# Patient Record
Sex: FEMALE | Race: BLACK OR AFRICAN AMERICAN | NOT HISPANIC OR LATINO | URBAN - METROPOLITAN AREA
[De-identification: names, ages, dates, MRNs, and addresses within clinical notes are randomized per-mention and may not be internally consistent; named-entity substitution may affect disease eponyms.]

---

## 2023-01-01 ENCOUNTER — INPATIENT (INPATIENT)
Facility: HOSPITAL | Age: 88
LOS: 0 days | End: 2023-10-11
Attending: INTERNAL MEDICINE | Admitting: INTERNAL MEDICINE
Payer: MEDICARE

## 2023-01-01 VITALS
WEIGHT: 108.03 LBS | HEIGHT: 66 IN | HEART RATE: 112 BPM | SYSTOLIC BLOOD PRESSURE: 56 MMHG | DIASTOLIC BLOOD PRESSURE: 36 MMHG

## 2023-01-01 VITALS — HEART RATE: 98 BPM | OXYGEN SATURATION: 61 %

## 2023-01-01 LAB
A1C WITH ESTIMATED AVERAGE GLUCOSE RESULT: 6.8 % — HIGH (ref 4–5.6)
ALBUMIN SERPL ELPH-MCNC: 2 G/DL — LOW (ref 3.3–5)
ALBUMIN SERPL ELPH-MCNC: 2 G/DL — LOW (ref 3.3–5)
ALBUMIN SERPL ELPH-MCNC: 2.3 G/DL — LOW (ref 3.3–5)
ALP SERPL-CCNC: 106 U/L — SIGNIFICANT CHANGE UP (ref 40–120)
ALP SERPL-CCNC: 117 U/L — SIGNIFICANT CHANGE UP (ref 40–120)
ALP SERPL-CCNC: 97 U/L — SIGNIFICANT CHANGE UP (ref 40–120)
ALT FLD-CCNC: 118 U/L — HIGH (ref 12–78)
ALT FLD-CCNC: 120 U/L — HIGH (ref 12–78)
ALT FLD-CCNC: 158 U/L — HIGH (ref 12–78)
ANION GAP SERPL CALC-SCNC: 11 MMOL/L — SIGNIFICANT CHANGE UP (ref 5–17)
ANION GAP SERPL CALC-SCNC: 12 MMOL/L — SIGNIFICANT CHANGE UP (ref 5–17)
ANION GAP SERPL CALC-SCNC: 8 MMOL/L — SIGNIFICANT CHANGE UP (ref 5–17)
APTT BLD: 34.1 SEC — SIGNIFICANT CHANGE UP (ref 24.5–35.6)
AST SERPL-CCNC: 144 U/L — HIGH (ref 15–37)
AST SERPL-CCNC: 204 U/L — HIGH (ref 15–37)
AST SERPL-CCNC: 225 U/L — HIGH (ref 15–37)
BASOPHILS # BLD AUTO: 0 K/UL — SIGNIFICANT CHANGE UP (ref 0–0.2)
BASOPHILS # BLD AUTO: 0.04 K/UL — SIGNIFICANT CHANGE UP (ref 0–0.2)
BASOPHILS NFR BLD AUTO: 0 % — SIGNIFICANT CHANGE UP (ref 0–2)
BASOPHILS NFR BLD AUTO: 0.5 % — SIGNIFICANT CHANGE UP (ref 0–2)
BILIRUB SERPL-MCNC: 0.2 MG/DL — SIGNIFICANT CHANGE UP (ref 0.2–1.2)
BILIRUB SERPL-MCNC: 0.3 MG/DL — SIGNIFICANT CHANGE UP (ref 0.2–1.2)
BILIRUB SERPL-MCNC: 0.4 MG/DL — SIGNIFICANT CHANGE UP (ref 0.2–1.2)
BUN SERPL-MCNC: 23 MG/DL — SIGNIFICANT CHANGE UP (ref 7–23)
BUN SERPL-MCNC: 27 MG/DL — HIGH (ref 7–23)
BUN SERPL-MCNC: 31 MG/DL — HIGH (ref 7–23)
CALCIUM SERPL-MCNC: 8.1 MG/DL — LOW (ref 8.5–10.1)
CALCIUM SERPL-MCNC: 8.2 MG/DL — LOW (ref 8.5–10.1)
CALCIUM SERPL-MCNC: 8.5 MG/DL — SIGNIFICANT CHANGE UP (ref 8.5–10.1)
CHLORIDE SERPL-SCNC: 108 MMOL/L — SIGNIFICANT CHANGE UP (ref 96–108)
CHLORIDE SERPL-SCNC: 109 MMOL/L — HIGH (ref 96–108)
CHLORIDE SERPL-SCNC: 111 MMOL/L — HIGH (ref 96–108)
CO2 SERPL-SCNC: 23 MMOL/L — SIGNIFICANT CHANGE UP (ref 22–31)
CO2 SERPL-SCNC: 23 MMOL/L — SIGNIFICANT CHANGE UP (ref 22–31)
CO2 SERPL-SCNC: 24 MMOL/L — SIGNIFICANT CHANGE UP (ref 22–31)
CREAT SERPL-MCNC: 1.29 MG/DL — SIGNIFICANT CHANGE UP (ref 0.5–1.3)
CREAT SERPL-MCNC: 1.38 MG/DL — HIGH (ref 0.5–1.3)
CREAT SERPL-MCNC: 1.72 MG/DL — HIGH (ref 0.5–1.3)
EGFR: 26 ML/MIN/1.73M2 — LOW
EGFR: 34 ML/MIN/1.73M2 — LOW
EGFR: 37 ML/MIN/1.73M2 — LOW
EOSINOPHIL # BLD AUTO: 0 K/UL — SIGNIFICANT CHANGE UP (ref 0–0.5)
EOSINOPHIL # BLD AUTO: 0 K/UL — SIGNIFICANT CHANGE UP (ref 0–0.5)
EOSINOPHIL NFR BLD AUTO: 0 % — SIGNIFICANT CHANGE UP (ref 0–6)
EOSINOPHIL NFR BLD AUTO: 0 % — SIGNIFICANT CHANGE UP (ref 0–6)
ESTIMATED AVERAGE GLUCOSE: 148 MG/DL — HIGH (ref 68–114)
GAS PNL BLDA: SIGNIFICANT CHANGE UP
GAS PNL BLDA: SIGNIFICANT CHANGE UP
GLUCOSE BLDC GLUCOMTR-MCNC: 15 MG/DL — CRITICAL LOW (ref 70–99)
GLUCOSE BLDC GLUCOMTR-MCNC: 16 MG/DL — CRITICAL LOW (ref 70–99)
GLUCOSE BLDC GLUCOMTR-MCNC: 34 MG/DL — CRITICAL LOW (ref 70–99)
GLUCOSE BLDC GLUCOMTR-MCNC: 56 MG/DL — LOW (ref 70–99)
GLUCOSE BLDC GLUCOMTR-MCNC: 63 MG/DL — LOW (ref 70–99)
GLUCOSE BLDC GLUCOMTR-MCNC: 99 MG/DL — SIGNIFICANT CHANGE UP (ref 70–99)
GLUCOSE SERPL-MCNC: 23 MG/DL — CRITICAL LOW (ref 70–99)
GLUCOSE SERPL-MCNC: 271 MG/DL — HIGH (ref 70–99)
GLUCOSE SERPL-MCNC: 276 MG/DL — HIGH (ref 70–99)
HCT VFR BLD CALC: 36.4 % — SIGNIFICANT CHANGE UP (ref 34.5–45)
HCT VFR BLD CALC: 38.9 % — SIGNIFICANT CHANGE UP (ref 34.5–45)
HCT VFR BLD CALC: 39.1 % — SIGNIFICANT CHANGE UP (ref 34.5–45)
HGB BLD-MCNC: 10.6 G/DL — LOW (ref 11.5–15.5)
HGB BLD-MCNC: 12.1 G/DL — SIGNIFICANT CHANGE UP (ref 11.5–15.5)
HGB BLD-MCNC: 12.3 G/DL — SIGNIFICANT CHANGE UP (ref 11.5–15.5)
IMM GRANULOCYTES NFR BLD AUTO: 0.6 % — SIGNIFICANT CHANGE UP (ref 0–0.9)
INR BLD: 1.03 RATIO — SIGNIFICANT CHANGE UP (ref 0.85–1.18)
LACTATE SERPL-SCNC: 3 MMOL/L — HIGH (ref 0.7–2)
LACTATE SERPL-SCNC: 4.6 MMOL/L — CRITICAL HIGH (ref 0.7–2)
LYMPHOCYTES # BLD AUTO: 0.96 K/UL — LOW (ref 1–3.3)
LYMPHOCYTES # BLD AUTO: 1.73 K/UL — SIGNIFICANT CHANGE UP (ref 1–3.3)
LYMPHOCYTES # BLD AUTO: 11.3 % — LOW (ref 13–44)
LYMPHOCYTES # BLD AUTO: 26 % — SIGNIFICANT CHANGE UP (ref 13–44)
MAGNESIUM SERPL-MCNC: 2.2 MG/DL — SIGNIFICANT CHANGE UP (ref 1.6–2.6)
MAGNESIUM SERPL-MCNC: 2.2 MG/DL — SIGNIFICANT CHANGE UP (ref 1.6–2.6)
MAGNESIUM SERPL-MCNC: 2.4 MG/DL — SIGNIFICANT CHANGE UP (ref 1.6–2.6)
MANUAL SMEAR VERIFICATION: SIGNIFICANT CHANGE UP
MCHC RBC-ENTMCNC: 27.2 PG — SIGNIFICANT CHANGE UP (ref 27–34)
MCHC RBC-ENTMCNC: 27.4 PG — SIGNIFICANT CHANGE UP (ref 27–34)
MCHC RBC-ENTMCNC: 27.8 PG — SIGNIFICANT CHANGE UP (ref 27–34)
MCHC RBC-ENTMCNC: 29.1 G/DL — LOW (ref 32–36)
MCHC RBC-ENTMCNC: 30.9 G/DL — LOW (ref 32–36)
MCHC RBC-ENTMCNC: 31.6 G/DL — LOW (ref 32–36)
MCV RBC AUTO: 87.8 FL — SIGNIFICANT CHANGE UP (ref 80–100)
MCV RBC AUTO: 88.7 FL — SIGNIFICANT CHANGE UP (ref 80–100)
MCV RBC AUTO: 93.3 FL — SIGNIFICANT CHANGE UP (ref 80–100)
MONOCYTES # BLD AUTO: 0.6 K/UL — SIGNIFICANT CHANGE UP (ref 0–0.9)
MONOCYTES # BLD AUTO: 0.66 K/UL — SIGNIFICANT CHANGE UP (ref 0–0.9)
MONOCYTES NFR BLD AUTO: 7.7 % — SIGNIFICANT CHANGE UP (ref 2–14)
MONOCYTES NFR BLD AUTO: 9 % — SIGNIFICANT CHANGE UP (ref 2–14)
NEUTROPHILS # BLD AUTO: 4.34 K/UL — SIGNIFICANT CHANGE UP (ref 1.8–7.4)
NEUTROPHILS # BLD AUTO: 6.82 K/UL — SIGNIFICANT CHANGE UP (ref 1.8–7.4)
NEUTROPHILS NFR BLD AUTO: 65 % — SIGNIFICANT CHANGE UP (ref 43–77)
NEUTROPHILS NFR BLD AUTO: 79.9 % — HIGH (ref 43–77)
NRBC # BLD: 0 /100 WBCS — SIGNIFICANT CHANGE UP (ref 0–0)
NRBC # BLD: 0 /100 WBCS — SIGNIFICANT CHANGE UP (ref 0–0)
NRBC # BLD: 0 /100 — SIGNIFICANT CHANGE UP (ref 0–0)
NRBC # BLD: SIGNIFICANT CHANGE UP /100 WBCS (ref 0–0)
NT-PROBNP SERPL-SCNC: 9866 PG/ML — HIGH (ref 0–450)
PHOSPHATE SERPL-MCNC: 2.9 MG/DL — SIGNIFICANT CHANGE UP (ref 2.5–4.5)
PHOSPHATE SERPL-MCNC: 4.1 MG/DL — SIGNIFICANT CHANGE UP (ref 2.5–4.5)
PLAT MORPH BLD: NORMAL — SIGNIFICANT CHANGE UP
PLATELET # BLD AUTO: 344 K/UL — SIGNIFICANT CHANGE UP (ref 150–400)
PLATELET # BLD AUTO: 392 K/UL — SIGNIFICANT CHANGE UP (ref 150–400)
PLATELET # BLD AUTO: 402 K/UL — HIGH (ref 150–400)
POTASSIUM SERPL-MCNC: 3.3 MMOL/L — LOW (ref 3.5–5.3)
POTASSIUM SERPL-MCNC: 3.4 MMOL/L — LOW (ref 3.5–5.3)
POTASSIUM SERPL-MCNC: 3.8 MMOL/L — SIGNIFICANT CHANGE UP (ref 3.5–5.3)
POTASSIUM SERPL-SCNC: 3.3 MMOL/L — LOW (ref 3.5–5.3)
POTASSIUM SERPL-SCNC: 3.4 MMOL/L — LOW (ref 3.5–5.3)
POTASSIUM SERPL-SCNC: 3.8 MMOL/L — SIGNIFICANT CHANGE UP (ref 3.5–5.3)
PROT SERPL-MCNC: 5.5 GM/DL — LOW (ref 6–8.3)
PROT SERPL-MCNC: 5.8 GM/DL — LOW (ref 6–8.3)
PROT SERPL-MCNC: 6.1 GM/DL — SIGNIFICANT CHANGE UP (ref 6–8.3)
PROTHROM AB SERPL-ACNC: 12.3 SEC — SIGNIFICANT CHANGE UP (ref 9.5–13)
RAPID RVP RESULT: DETECTED
RBC # BLD: 3.9 M/UL — SIGNIFICANT CHANGE UP (ref 3.8–5.2)
RBC # BLD: 4.41 M/UL — SIGNIFICANT CHANGE UP (ref 3.8–5.2)
RBC # BLD: 4.43 M/UL — SIGNIFICANT CHANGE UP (ref 3.8–5.2)
RBC # FLD: 16 % — HIGH (ref 10.3–14.5)
RBC # FLD: 16.3 % — HIGH (ref 10.3–14.5)
RBC # FLD: 16.5 % — HIGH (ref 10.3–14.5)
RBC BLD AUTO: NORMAL — SIGNIFICANT CHANGE UP
RV+EV RNA SPEC QL NAA+PROBE: DETECTED
SARS-COV-2 RNA SPEC QL NAA+PROBE: SIGNIFICANT CHANGE UP
SODIUM SERPL-SCNC: 143 MMOL/L — SIGNIFICANT CHANGE UP (ref 135–145)
TROPONIN I, HIGH SENSITIVITY RESULT: 1452.6 NG/L — HIGH
TROPONIN I, HIGH SENSITIVITY RESULT: 181.5 NG/L — HIGH
WBC # BLD: 6.67 K/UL — SIGNIFICANT CHANGE UP (ref 3.8–10.5)
WBC # BLD: 7.81 K/UL — SIGNIFICANT CHANGE UP (ref 3.8–10.5)
WBC # BLD: 8.53 K/UL — SIGNIFICANT CHANGE UP (ref 3.8–10.5)
WBC # FLD AUTO: 6.67 K/UL — SIGNIFICANT CHANGE UP (ref 3.8–10.5)
WBC # FLD AUTO: 7.81 K/UL — SIGNIFICANT CHANGE UP (ref 3.8–10.5)
WBC # FLD AUTO: 8.53 K/UL — SIGNIFICANT CHANGE UP (ref 3.8–10.5)

## 2023-01-01 PROCEDURE — 99291 CRITICAL CARE FIRST HOUR: CPT

## 2023-01-01 PROCEDURE — 71045 X-RAY EXAM CHEST 1 VIEW: CPT | Mod: 26,77

## 2023-01-01 PROCEDURE — 93010 ELECTROCARDIOGRAM REPORT: CPT

## 2023-01-01 PROCEDURE — 71045 X-RAY EXAM CHEST 1 VIEW: CPT | Mod: 26

## 2023-01-01 PROCEDURE — 70450 CT HEAD/BRAIN W/O DYE: CPT | Mod: 26

## 2023-01-01 RX ORDER — MORPHINE SULFATE 50 MG/1
1 CAPSULE, EXTENDED RELEASE ORAL
Refills: 0 | Status: DISCONTINUED | OUTPATIENT
Start: 2023-01-01 | End: 2023-01-01

## 2023-01-01 RX ORDER — CHLORHEXIDINE GLUCONATE 213 G/1000ML
1 SOLUTION TOPICAL
Refills: 0 | Status: DISCONTINUED | OUTPATIENT
Start: 2023-01-01 | End: 2023-01-01

## 2023-01-01 RX ORDER — NOREPINEPHRINE BITARTRATE/D5W 8 MG/250ML
0.05 PLASTIC BAG, INJECTION (ML) INTRAVENOUS
Qty: 8 | Refills: 0 | Status: DISCONTINUED | OUTPATIENT
Start: 2023-01-01 | End: 2023-01-01

## 2023-01-01 RX ORDER — VANCOMYCIN HCL 1 G
VIAL (EA) INTRAVENOUS
Refills: 0 | Status: DISCONTINUED | OUTPATIENT
Start: 2023-01-01 | End: 2023-01-01

## 2023-01-01 RX ORDER — CHLORHEXIDINE GLUCONATE 213 G/1000ML
15 SOLUTION TOPICAL EVERY 12 HOURS
Refills: 0 | Status: DISCONTINUED | OUTPATIENT
Start: 2023-01-01 | End: 2023-01-01

## 2023-01-01 RX ORDER — VANCOMYCIN HCL 1 G
500 VIAL (EA) INTRAVENOUS ONCE
Refills: 0 | Status: COMPLETED | OUTPATIENT
Start: 2023-01-01 | End: 2023-01-01

## 2023-01-01 RX ORDER — DEXTROSE 50 % IN WATER 50 %
50 SYRINGE (ML) INTRAVENOUS ONCE
Refills: 0 | Status: COMPLETED | OUTPATIENT
Start: 2023-01-01 | End: 2023-01-01

## 2023-01-01 RX ORDER — SODIUM CHLORIDE 9 MG/ML
2000 INJECTION INTRAMUSCULAR; INTRAVENOUS; SUBCUTANEOUS ONCE
Refills: 0 | Status: COMPLETED | OUTPATIENT
Start: 2023-01-01 | End: 2023-01-01

## 2023-01-01 RX ORDER — HYDROMORPHONE HYDROCHLORIDE 2 MG/ML
0.25 INJECTION INTRAMUSCULAR; INTRAVENOUS; SUBCUTANEOUS
Refills: 0 | Status: DISCONTINUED | OUTPATIENT
Start: 2023-01-01 | End: 2023-01-01

## 2023-01-01 RX ORDER — ACETAMINOPHEN 500 MG
725 TABLET ORAL ONCE
Refills: 0 | Status: COMPLETED | OUTPATIENT
Start: 2023-01-01 | End: 2023-01-01

## 2023-01-01 RX ORDER — VANCOMYCIN HCL 1 G
500 VIAL (EA) INTRAVENOUS EVERY 24 HOURS
Refills: 0 | Status: DISCONTINUED | OUTPATIENT
Start: 2023-01-01 | End: 2023-01-01

## 2023-01-01 RX ORDER — FUROSEMIDE 40 MG
40 TABLET ORAL ONCE
Refills: 0 | Status: COMPLETED | OUTPATIENT
Start: 2023-01-01 | End: 2023-01-01

## 2023-01-01 RX ORDER — PIPERACILLIN AND TAZOBACTAM 4; .5 G/20ML; G/20ML
3.38 INJECTION, POWDER, LYOPHILIZED, FOR SOLUTION INTRAVENOUS EVERY 12 HOURS
Refills: 0 | Status: DISCONTINUED | OUTPATIENT
Start: 2023-01-01 | End: 2023-01-01

## 2023-01-01 RX ORDER — SODIUM CHLORIDE 9 MG/ML
1000 INJECTION, SOLUTION INTRAVENOUS
Refills: 0 | Status: DISCONTINUED | OUTPATIENT
Start: 2023-01-01 | End: 2023-01-01

## 2023-01-01 RX ORDER — PIPERACILLIN AND TAZOBACTAM 4; .5 G/20ML; G/20ML
3.38 INJECTION, POWDER, LYOPHILIZED, FOR SOLUTION INTRAVENOUS ONCE
Refills: 0 | Status: COMPLETED | OUTPATIENT
Start: 2023-01-01 | End: 2023-01-01

## 2023-01-01 RX ORDER — VANCOMYCIN HCL 1 G
500 VIAL (EA) INTRAVENOUS ONCE
Refills: 0 | Status: DISCONTINUED | OUTPATIENT
Start: 2023-01-01 | End: 2023-01-01

## 2023-01-01 RX ORDER — INSULIN LISPRO 100/ML
VIAL (ML) SUBCUTANEOUS EVERY 6 HOURS
Refills: 0 | Status: DISCONTINUED | OUTPATIENT
Start: 2023-01-01 | End: 2023-01-01

## 2023-01-01 RX ORDER — DEXTROSE 50 % IN WATER 50 %
50 SYRINGE (ML) INTRAVENOUS ONCE
Refills: 0 | Status: DISCONTINUED | OUTPATIENT
Start: 2023-01-01 | End: 2023-01-01

## 2023-01-01 RX ORDER — PIPERACILLIN AND TAZOBACTAM 4; .5 G/20ML; G/20ML
3.38 INJECTION, POWDER, LYOPHILIZED, FOR SOLUTION INTRAVENOUS ONCE
Refills: 0 | Status: DISCONTINUED | OUTPATIENT
Start: 2023-01-01 | End: 2023-01-01

## 2023-01-01 RX ORDER — SCOPALAMINE 1 MG/3D
1 PATCH, EXTENDED RELEASE TRANSDERMAL ONCE
Refills: 0 | Status: COMPLETED | OUTPATIENT
Start: 2023-01-01 | End: 2023-01-01

## 2023-01-01 RX ORDER — PANTOPRAZOLE SODIUM 20 MG/1
40 TABLET, DELAYED RELEASE ORAL DAILY
Refills: 0 | Status: DISCONTINUED | OUTPATIENT
Start: 2023-01-01 | End: 2023-01-01

## 2023-01-01 RX ORDER — HYDROCORTISONE 20 MG
50 TABLET ORAL EVERY 6 HOURS
Refills: 0 | Status: DISCONTINUED | OUTPATIENT
Start: 2023-01-01 | End: 2023-01-01

## 2023-01-01 RX ORDER — ACETAMINOPHEN 500 MG
1000 TABLET ORAL ONCE
Refills: 0 | Status: COMPLETED | OUTPATIENT
Start: 2023-01-01 | End: 2023-01-01

## 2023-01-01 RX ORDER — ENOXAPARIN SODIUM 100 MG/ML
30 INJECTION SUBCUTANEOUS ONCE
Refills: 0 | Status: COMPLETED | OUTPATIENT
Start: 2023-01-01 | End: 2023-01-01

## 2023-01-01 RX ADMIN — CHLORHEXIDINE GLUCONATE 1 APPLICATION(S): 213 SOLUTION TOPICAL at 05:41

## 2023-01-01 RX ADMIN — Medication 40 MILLIGRAM(S): at 18:39

## 2023-01-01 RX ADMIN — Medication 50 MILLIGRAM(S): at 06:42

## 2023-01-01 RX ADMIN — PIPERACILLIN AND TAZOBACTAM 200 GRAM(S): 4; .5 INJECTION, POWDER, LYOPHILIZED, FOR SOLUTION INTRAVENOUS at 17:56

## 2023-01-01 RX ADMIN — Medication 5.63 MICROGRAM(S)/KG/MIN: at 04:04

## 2023-01-01 RX ADMIN — Medication 50 MILLIGRAM(S): at 17:16

## 2023-01-01 RX ADMIN — Medication 50 MILLILITER(S): at 05:41

## 2023-01-01 RX ADMIN — CHLORHEXIDINE GLUCONATE 1 APPLICATION(S): 213 SOLUTION TOPICAL at 17:59

## 2023-01-01 RX ADMIN — Medication 100 MILLIGRAM(S): at 21:10

## 2023-01-01 RX ADMIN — Medication 50 MILLILITER(S): at 06:41

## 2023-01-01 RX ADMIN — SCOPALAMINE 1 PATCH: 1 PATCH, EXTENDED RELEASE TRANSDERMAL at 14:32

## 2023-01-01 RX ADMIN — Medication 400 MILLIGRAM(S): at 16:54

## 2023-01-01 RX ADMIN — PIPERACILLIN AND TAZOBACTAM 25 GRAM(S): 4; .5 INJECTION, POWDER, LYOPHILIZED, FOR SOLUTION INTRAVENOUS at 03:58

## 2023-01-01 RX ADMIN — CHLORHEXIDINE GLUCONATE 15 MILLILITER(S): 213 SOLUTION TOPICAL at 05:45

## 2023-01-01 RX ADMIN — Medication 3: at 23:35

## 2023-01-01 RX ADMIN — HYDROMORPHONE HYDROCHLORIDE 0.25 MILLIGRAM(S): 2 INJECTION INTRAMUSCULAR; INTRAVENOUS; SUBCUTANEOUS at 21:10

## 2023-01-01 RX ADMIN — SODIUM CHLORIDE 100 MILLILITER(S): 9 INJECTION, SOLUTION INTRAVENOUS at 16:53

## 2023-01-01 RX ADMIN — Medication 100 MILLIGRAM(S): at 17:37

## 2023-01-01 RX ADMIN — SODIUM CHLORIDE 2000 MILLILITER(S): 9 INJECTION INTRAMUSCULAR; INTRAVENOUS; SUBCUTANEOUS at 14:45

## 2023-01-01 RX ADMIN — Medication 50 MILLILITER(S): at 09:53

## 2023-01-01 RX ADMIN — Medication 5.63 MICROGRAM(S)/KG/MIN: at 13:43

## 2023-01-01 RX ADMIN — PANTOPRAZOLE SODIUM 40 MILLIGRAM(S): 20 TABLET, DELAYED RELEASE ORAL at 22:36

## 2023-01-01 RX ADMIN — PANTOPRAZOLE SODIUM 40 MILLIGRAM(S): 20 TABLET, DELAYED RELEASE ORAL at 12:41

## 2023-01-01 RX ADMIN — SODIUM CHLORIDE 100 MILLILITER(S): 9 INJECTION, SOLUTION INTRAVENOUS at 06:44

## 2023-01-01 RX ADMIN — Medication 50 MILLIGRAM(S): at 12:41

## 2023-01-01 RX ADMIN — PIPERACILLIN AND TAZOBACTAM 25 GRAM(S): 4; .5 INJECTION, POWDER, LYOPHILIZED, FOR SOLUTION INTRAVENOUS at 14:33

## 2023-01-01 RX ADMIN — SODIUM CHLORIDE 2000 MILLILITER(S): 9 INJECTION INTRAMUSCULAR; INTRAVENOUS; SUBCUTANEOUS at 13:40

## 2023-01-01 RX ADMIN — ENOXAPARIN SODIUM 30 MILLIGRAM(S): 100 INJECTION SUBCUTANEOUS at 18:39

## 2023-01-01 RX ADMIN — SODIUM CHLORIDE 100 MILLILITER(S): 9 INJECTION, SOLUTION INTRAVENOUS at 07:30

## 2023-01-01 RX ADMIN — CHLORHEXIDINE GLUCONATE 15 MILLILITER(S): 213 SOLUTION TOPICAL at 17:57

## 2023-01-01 RX ADMIN — CHLORHEXIDINE GLUCONATE 15 MILLILITER(S): 213 SOLUTION TOPICAL at 17:15

## 2023-01-01 RX ADMIN — Medication 5.63 MICROGRAM(S)/KG/MIN: at 09:52

## 2023-01-01 RX ADMIN — Medication 290 MILLIGRAM(S): at 03:56

## 2023-01-01 RX ADMIN — Medication 725 MILLIGRAM(S): at 04:46

## 2023-10-10 NOTE — PATIENT PROFILE ADULT - HOW PATIENT ADDRESSED, PROFILE
Mrs. Shirley   Doxycycline Pregnancy And Lactation Text: This medication is Pregnancy Category D and not consider safe during pregnancy. It is also excreted in breast milk but is considered safe for shorter treatment courses.

## 2023-10-10 NOTE — ED PROVIDER NOTE - OBJECTIVE STATEMENT
89 yo female with pmh htn, dementia, dm, presenting as cardiac arrest.  Patient was just admitted at [facility] for aspiration pna and discharged yesterday.  Daughter and granddaughter at bedside states patient was gradually decompensating but they thought she would want to be home for her birthday today.  Today had witnessed arrest after aspirating on water.  EMS reports 45m downtime.  ROSC once at home 89 yo female with pmh htn, dementia, dm, presenting as cardiac arrest.  Patient was just admitted at [facility] for aspiration pna and discharged yesterday.  Daughter and granddaughter at bedside states patient was gradually decompensating but they thought she would want to be home for her birthday today.  Today had witnessed arrest after aspirating on water.  EMS reports 45m downtime.  ROSC once at home and then coded again in ambulance with rosc again.  Asystole and PEA.  5x epi given, no shocks.  Intubated with 7.5ett 21cm at lip.  ETCO2 appropriate and tube confirmed by glide.  D/w daughter and HCP Antonieta.  Patient is DNR/ DNI but they could not find the paperwork on ems arrival so they resuscitated her.

## 2023-10-10 NOTE — H&P ADULT - ASSESSMENT
89 year old female with PHX HTN (no longer taking any medication) Dementia BIB EMS with family S/P cardiac arrest. Approx 45min downtime. ROSC once at home and then coded again in ambulance with ROSC again.  Asystole and PEA.  5x epi given, no defibrillation. Labs significant for + Enterovirus, pH, Arterial: 7.12   /  pCO2: 63    /  pO2: 86    / HCO3: 20    / Base Excess: -9.2; Troponin 181.5; Pro- BNP 9866; Glucose 271. Accepted to ICU for further mgmt.     PLAN:    #NEURO:   - Not following commands, no pupillary reaction, no gag reflex. +withdraws from pain and over breathes ventilator. CT HEAD negative. Not on sedation. Monitor MS.    #PULM:  - Acute hypoxic respiratory failure likely in setting of + Enterovirus and Aspiration event. Intubated S/P cardiac arrest. SBT as tolerates    #CV:   - Cardiac arrest likely hypoxemic with witnessed aspiration and asystolic arrest. Shock state likely septic shock with underlying HF. Levophed to maintain MAP>65.   - Tropinemia likely demand in setting of cardiac arrest, will trend troponin. EKG non ischemic  - Elevated pro BNP possibly with undiagnosed HF, WIll diurese and reassess daily.   - Official TTE    #GI:  - NPO for now, Awaiting confirmatory CXR for NGT, will reassess for TF   - Protonix GI PPX    #ID:  - + Enterovirus: supportive care  - Zosyn and Vancomycin empirically for presumed aspiration PNA. F/U blood cultures and de escalate as warranted.     #Renal:  - No active issues, Montior BUN, Cr, UO, Electrolytes and replace PRN    #Endo:  - Hyperglycemia on labs, Reported PHX pre DM not on medication.  - FS, SSI, maintain -180    #HEME:  - Lovenox for DVT ppx    #General:  - Full code, prognosis guarded.  - Reportedly previously DNR/DNI, no active MOLST form. Family would like Full code for now.  - Daughters at bedside Updated.   - Plan of care discussed with ICU attending MD Farnsworth.

## 2023-10-10 NOTE — ED ADULT NURSE NOTE - OBJECTIVE STATEMENT
Received 89 year old female active cardiac arrest, as per EMS pt was drinking water, started choking and became unresponsive, all witnessed by the daughter. As per EMS pt was given 5 epi, intubated 7.5 ETT and 22 at the lip. PMH: HTN, DM, dementia Received 89 year old female post cardiac arrest, intubated in field, 7.5, 22 right lip as per EMS pt was drinking water, started choking and became unresponsive, all witnessed by the daughter. As per EMS pt was given 5 epi, was recently admitted and discharged yesterday for aspiration pneumonia. PMH: HTN, DM, dementia

## 2023-10-10 NOTE — PATIENT PROFILE ADULT - NSPROPTRIGHTREPNAME_GEN_A__NUR
Antonieta Narayanan Picato Pregnancy And Lactation Text: This medication is Pregnancy Category C. It is unknown if this medication is excreted in breast milk.

## 2023-10-10 NOTE — ED ADULT NURSE NOTE - NS ED NURSE TRANSPORT WITH
Cardiac Monitor/Defib/ACLS/Rescue Kit/O2/BVM/oxygen/external pacer/IV pump/pulse ox/ventilator/ACLS Rescue Kit

## 2023-10-10 NOTE — ED PROVIDER NOTE - PHYSICAL EXAMINATION
General appearance: Intubated, afebrile    Eyes: anicteric sclerae, TYLER, EOMI 4mm b/l minimally reactive   HENT: Atraumatic; ETT in place   Neck: Trachea midline; Full range of motion, supple   Pulm: CTA bl, mechanically ventilated   CV: RRR, No murmurs, rubs, or gallops   Abdomen: Soft, non-tender, non-distended; no guarding or rebound   Extremities: No peripheral edema, no gross deformities, FROM x4   Skin: Dry, normal temperature, turgor and texture; no rash

## 2023-10-10 NOTE — H&P ADULT - CRITICAL CARE ATTENDING COMMENT
89 y/o F w/HTN, likely HF unknown EF, multiple recent admissions for acute hypoxemic respiratory failure now presenting following cardiac arrest. Cardiac arrest likely secondary to acute respiratory failure due to aspiration. Prolonged downtime ~ 45 minutes. Likely severe anoxic brain injury. Acute respiratory failure with hypoxia and hypercapnia secondary to arrest. Hypotension possibly severe sepsis with septic shock vs post arrest. Likely acute on chronic HF. Entero/rhinovirus positive (family reports she was positive during her prior hospitalizations). Possible MEGHAN (unknown baseline).    - Sedation as needed  - Continue mechanical ventilation  - Titrate pressors as needed goal MAP >= 65  - Bedside POCUS with mild-moderately reduced LVEF, biventricular hypertrophy, LV > RV, no evidence of decreased RV function or RV strain. Dilated IVC w/o variation consistent w/volume overload  - Diuresis goal net negative.   - Trend Cr, avoid nephrotoxins.  - Broad spectrum abx, pan culture  - DVT prophylaxis  - GOC discussions with family. Spoke with patient's daughter and grand daughter (HCP) and explained current condition and prognosis. Patient's grandaughter stated that patient previously expressed desire for all live saving interventions, however if it was unlikely that she was going to return to baseline that she would not want prolonged life sustaining measures. Remains full code for now.

## 2023-10-10 NOTE — ED PROVIDER NOTE - CRITICAL CARE ATTENDING CONTRIBUTION TO CARE
Seen with DAVID Dial    Cardiac arrest outpatient with rosc now.  Not on sedation.  DNR/DNI per family and hcp at bedside but unable to produce form so EMS resuscitated and intubated.  Likely 2/2 aspiration.  Family unsure of goc now - per hcp patient would want anything done to the point where she may still have baseline mental status.  Will ct head, obtain labs, and admit to icu.    Upon my evaluation, this patient had a high probability of imminent or life-threatening deterioration due to cardiac arrest, which required my direct attention, intervention, and personal management.  The patient has a  medical condition that impairs one or more vital organ systems.  Frequent personal assessment and adjustment of medical interventions was performed.      I have personally provided 60 minutes of critical care time exclusive of time spent on separately billable procedures. Time includes review of laboratory data, radiology results, discussion with consultants, patient and family; monitoring for potential decompensation, as well as time spent retrieving data and reviewing the chart and documenting the visit. Interventions were performed as documented above.

## 2023-10-10 NOTE — H&P ADULT - NSHPPHYSICALEXAM_GEN_ALL_CORE
T(C): 35.9 (10-10-23 @ 14:39), Max: 35.9 (10-10-23 @ 14:39)  HR: 78 (10-10-23 @ 17:25) (74 - 112)  BP: 132/76 (10-10-23 @ 16:39) (48/37 - 136/71)  RR: 22 (10-10-23 @ 16:39) (14 - 22)  SpO2: 100% (10-10-23 @ 16:39) (99% - 100%)    CONSTITUTIONAL: Orally intubated, elderly female   EYES: Pupils non reactive, dilated, No conjunctival or scleral injection, non-icteric  ENMT:Dry oral mucosa, orally intubated  NECK: Supple, symmetric and without tracheal deviation   RESP: Mechanical breath sounds bilaterally, course R>L  CV: RRR, +S1S2, no MRG; no JVD; no peripheral edema  GI: Soft, +distended, healed hernia repair scars,  no guarding;   SKIN: No rashes or ulcers noted; no subcutaneous nodules or induration palpable  MSK: B/L LE cool to touch, + Pedal pulses B/L, Cyanotic, no edema  NEURO: Not on sedation, not following commands. Withdraws from pain, overbreathes ventilator intermittently

## 2023-10-10 NOTE — PATIENT PROFILE ADULT - FUNCTIONAL ASSESSMENT - BASIC MOBILITY 6.
2-calculated by average/Not able to assess (calculate score using [facility] averaging method)

## 2023-10-10 NOTE — ED PROVIDER NOTE - CLINICAL SUMMARY MEDICAL DECISION MAKING FREE TEXT BOX
89 y/o F with PMH HTN, dementia, DM, BIBA s/p cardiac arrest, but intubated with rosc. recent admission for aspiration pna and discharged yesterday and today aspirated while drinking water.   per ems 45 min downtime with cardiac arrest x 2-- asystole and pea, given 5 rounds of epi. no defib.   pt is dnr/dni but family unable to produce molst form so was intubated and resuscitated by ems.   will admit to icu

## 2023-10-10 NOTE — PHARMACOTHERAPY INTERVENTION NOTE - COMMENTS
Per pharmacy policy for dose optimization on antimicrobials, renally dose adjusted Zosyn 3.375g Q8H to Zosyn 3.375g Q12H for CrCl 18.4 ml/min. Per pharmacy policy for dose optimization on antimicrobials, renally dose adjusted Zosyn 3.375g Q8H to Zosyn 3.375g Q12H for CrCl 18.4 ml/min.     Second dose of Zosyn retimed to be 8 hours after loading dose for CrCl<20 ml/min.

## 2023-10-10 NOTE — H&P ADULT - NSHPLABSRESULTS_GEN_ALL_CORE
LABS:                        10.6   6.67  )-----------( 344      ( 10 Oct 2023 14:15 )             36.4     10-10    143  |  108  |  23  ----------------------------<  271<H>  3.8   |  23  |  1.29    Ca    8.5      10 Oct 2023 14:15  Mg     2.4     10-10    TPro  5.5<L>  /  Alb  2.0<L>  /  TBili  0.2  /  DBili  x   /  AST  144<H>  /  ALT  120<H>  /  AlkPhos  106  10-10    Troponin I, High Sensitivity (10.10.23 @ 14:15)    Troponin I, High Sensitivity Result: 181.5:     Pro-Brain Natriuretic Peptide (10.10.23 @ 14:15)    Pro-Brain Natriuretic Peptide: 9866 pg/mL    ABG - ( 10 Oct 2023 14:23 )  pH, Arterial: 7.12  pH, Blood: x     /  pCO2: 63    /  pO2: 86    / HCO3: 20    / Base Excess: -9.2  /  SaO2: 93.7        CAPILLARY BLOOD GLUCOSE      POCT Blood Glucose.: 202 mg/dL (10 Oct 2023 13:32)    PT/INR - ( 10 Oct 2023 14:15 )   PT: 12.3 sec;   INR: 1.03 ratio         PTT - ( 10 Oct 2023 14:15 )  PTT:34.1 sec  Urinalysis Basic - ( 10 Oct 2023 14:15 )    Color: x / Appearance: x / SG: x / pH: x  Gluc: 271 mg/dL / Ketone: x  / Bili: x / Urobili: x   Blood: x / Protein: x / Nitrite: x   Leuk Esterase: x / RBC: x / WBC x   Sq Epi: x / Non Sq Epi: x / Bacteria: x

## 2023-10-10 NOTE — ED ADULT TRIAGE NOTE - CHIEF COMPLAINT QUOTE
pt biba post cardiac arrest, as per ems, pt was drinking water, started choking became unresponsive, witness by daughter. ems gave 5 epi, intubated PTA (7.5 ETT, 22LL). H/O HTN, DM, DEMENTIA, ASPIRATION PNA

## 2023-10-10 NOTE — CHART NOTE - NSCHARTNOTEFT_GEN_A_CORE
:  Hardeep Cesar NP    Indication:  SHOCK    PROCEDURE:  [X] LIMITED ECHO  [X] LIMITED CHEST    FINDINGS:  Chest: B-line predominant anteriorly, Bilateral Pleural effusions R>L    ECHO: Mildly reduced contractility, No evidence of RV strain, overall Hypertrophic heart, No pericardial effusion  IVC: Plump, non variable

## 2023-10-10 NOTE — H&P ADULT - HISTORY OF PRESENT ILLNESS
89 year old female with PHX HTN (no longer taking any medication) Dementia BIB EMS with family S/P cardiac arrest.Today had witnessed arrest after aspirating on water, daughter called EMS. EMS reported approx 45min downtime. ROSC once at home and then coded again in ambulance with ROSC again.  Asystole and PEA.  5x epi given, no shocks.  Intubated with 7.5ETT 21cm at lip prior to arrival.

## 2023-10-10 NOTE — PROGRESS NOTE ADULT - ASSESSMENT
89 yo f pmhx aspiration, HTN, dementia admitted s/p prolonged ooh cardiac arrest, likely anoxic encephalopathy, MEGHAN, transaminitis, shock, hypoxic respiratory failure.      NEURO: No MS off sedation  CV: shock state requring vasopressor therapy, actively titrating levophed for map >65.  weaning as tolerated  RESP: Hypoxic respiratory failure, ac/vc 4-6 cc/kg tv lung protective strategies, actively titrating settings for spo2 >92%, weaning as tolerated. inh bronchodilators, chest pt, suctioning as needed.   RENAL: MEGHAN, avoid nephrotoxic meds, renally dose meds, trend urine output, bun/cr and electrolytes. Replace lytes as needed.  askew for strict I&Os.  bladder scan q8 if no urine output  GI: NPO, ogt in place to low intermittent suction   ENDO: ISS for glycemic control  ID: zosyn and vanco for coverage, cx pending   HEME: scd  DISPO: Full code.     Critical Care time: 50 mins assessing presenting problems of acute illness that poses high probability of life threatening deterioration or end organ damage/dysfunction.  Medical decision making including Initiating plan of care, reviewing data, reviewing radiology, discussing with multidisciplinary team, non inclusive of procedures, discussing goals of care with patient/family

## 2023-10-10 NOTE — PROGRESS NOTE ADULT - SUBJECTIVE AND OBJECTIVE BOX
Patient is a 89y old  Female who presents with a chief complaint of Cardiac Arrest (10 Oct 2023 17:29)      BRIEF HOSPITAL COURSE:       Events last 24 hours:       PAST MEDICAL & SURGICAL HISTORY:  HTN (hypertension)  Dementia  DM (diabetes mellitus)      Allergies  No Known Allergies      FAMILY HISTORY:  unknown      Social History:   from home      Review of Systems:  unable to obtain      Physical Examination:    General: No acute distress.      HEENT: Pupils equal, reactive to light.  Symmetric.    PULM: Clear to auscultation bilaterally, no significant sputum production    CVS: Regular rate and rhythm, no murmurs, rubs, or gallops    ABD: Soft, nondistended, nontender, normoactive bowel sounds, no masses    EXT: No edema, nontender    SKIN: Warm and well perfused, no rashes noted.    NEURO: Alert, oriented, interactive, nonfocal      Medications:  vancomycin  IVPB      norepinephrine Infusion 0.05 MICROgram(s)/kG/Min IV Continuous <Continuous>  pantoprazole  Injectable 40 milliGRAM(s) IV Push daily  insulin lispro (ADMELOG) corrective regimen sliding scale   SubCutaneous every 6 hours  chlorhexidine 0.12% Liquid 15 milliLiter(s) Oral Mucosa every 12 hours  chlorhexidine 2% Cloths 1 Application(s) Topical <User Schedule>      Mode: AC/ CMV (Assist Control/ Continuous Mandatory Ventilation)  RR (machine): 18  TV (machine): 350  FiO2: 30  PEEP: 5  ITime: 1  MAP: 12  PIP: 27      ICU Vital Signs Last 24 Hrs  T(C): 35.9 (10 Oct 2023 14:39), Max: 35.9 (10 Oct 2023 14:39)  T(F): 96.7 (10 Oct 2023 14:39), Max: 96.7 (10 Oct 2023 14:39)  HR: 104 (10 Oct 2023 22:30) (74 - 112)  BP: 137/85 (10 Oct 2023 22:30) (48/37 - 145/82)  BP(mean): 103 (10 Oct 2023 22:30) (89 - 104)  ABP: --  ABP(mean): --  RR: 19 (10 Oct 2023 22:30) (14 - 22)  SpO2: 100% (10 Oct 2023 22:30) (99% - 100%)    O2 Parameters below as of 10 Oct 2023 16:39  Patient On (Oxygen Delivery Method): ventilator      Vital Signs Last 24 Hrs  T(C): 35.9 (10 Oct 2023 14:39), Max: 35.9 (10 Oct 2023 14:39)  T(F): 96.7 (10 Oct 2023 14:39), Max: 96.7 (10 Oct 2023 14:39)  HR: 104 (10 Oct 2023 22:30) (74 - 112)  BP: 137/85 (10 Oct 2023 22:30) (48/37 - 145/82)  BP(mean): 103 (10 Oct 2023 22:30) (89 - 104)  RR: 19 (10 Oct 2023 22:30) (14 - 22)  SpO2: 100% (10 Oct 2023 22:30) (99% - 100%)    Parameters below as of 10 Oct 2023 16:39  Patient On (Oxygen Delivery Method): ventilator    ABG - ( 10 Oct 2023 18:33 )  pH, Arterial: 7.39  pH, Blood: x     /  pCO2: 38    /  pO2: 104   / HCO3: 23    / Base Excess: -1.7  /  SaO2: 98.0        I&O's Detail    10 Oct 2023 07:01  -  10 Oct 2023 23:45  --------------------------------------------------------  IN:    IV PiggyBack: 100 mL    Norepinephrine: 31.2 mL  Total IN: 131.2 mL    OUT:  Total OUT: 0 mL  Total NET: 131.2 mL      LABS:                        12.1   7.81  )-----------( 392      ( 10 Oct 2023 18:30 )             39.1     10-10    143  |  109<H>  |  27<H>  ----------------------------<  276<H>  3.3<L>   |  23  |  1.38<H>    Ca    8.1<L>      10 Oct 2023 18:30  Phos  4.1     10-10  Mg     2.2     10-10    TPro  6.1  /  Alb  2.3<L>  /  TBili  0.3  /  DBili  x   /  AST  225<H>  /  ALT  158<H>  /  AlkPhos  117  10-10      CAPILLARY BLOOD GLUCOSE  POCT Blood Glucose.: 255 mg/dL (10 Oct 2023 23:19)      PT/INR - ( 10 Oct 2023 14:15 )   PT: 12.3 sec;   INR: 1.03 ratio    PTT - ( 10 Oct 2023 14:15 )  PTT:34.1 sec      Urinalysis Basic - ( 10 Oct 2023 18:30 )  Color: x / Appearance: x / SG: x / pH: x  Gluc: 276 mg/dL / Ketone: x  / Bili: x / Urobili: x   Blood: x / Protein: x / Nitrite: x   Leuk Esterase: x / RBC: x / WBC x   Sq Epi: x / Non Sq Epi: x / Bacteria: x      CULTURES:  pending      RADIOLOGY:   < from: CT Head No Cont (10.10.23 @ 17:11) >  ACC: [id number] EXAM:  CT BRAIN   ORDERED BY: BRAVO AGUILAR     PROCEDURE DATE:  10/10/2023      INTERPRETATION:  CLINICAL INDICATION: Cardiac arrest    TECHNIQUE: Axial CT scanning of the brain was obtained from the skull   base to the vertex without the administration of intravenous contrast.   Reformatted coronal and sagittal images were subsequently obtained and   reviewed.    COMPARISON: None    FINDINGS:  There is no CT evidence of acute transcortical infarct. Age-related   involutionalchanges and chronic microvascular ischemic changes.    There is no hydrocephalus, mass effect, or acute intracranial hemorrhage.   No extra-axial collection. Basal cisterns are patent.    The visualized paranasal sinuses and mastoid air cells are clear.    The calvarium is intact.    IMPRESSION:  No evidence of acute transcortical infarct, acute intracranial   hemorrhage, or mass effect.    --- End of Report ---    JAX THURMAN MD; Attending Radiologist  This document has been electronically signed. Oct 10 2023  5:23PM    < end of copied text >   Patient is a 89y old  Female who presents with a chief complaint of Cardiac Arrest (10 Oct 2023 17:29)      BRIEF HOSPITAL COURSE:   89 yo f pmhx aspiration, HTN, dementia admitted s/p prolonged ooh cardiac arrest, likely anoxic encephalopathy, MEGHAN, transaminitis, shock, hypoxic respiratory failure.      Events last 24 hours:   repeat labs reveal improving lactate, levophed for bp support, minimal to absent urine output.  started on ivf.       PAST MEDICAL & SURGICAL HISTORY:  HTN (hypertension)  Dementia  DM (diabetes mellitus)      Allergies  No Known Allergies      FAMILY HISTORY:  unknown      Social History:   from home      Review of Systems:  unable to obtain      Physical Examination:    General: elderly female, No acute distress.      HEENT: ett/ogt in place    PULM: diminished b/l    CVS: rrr    ABD: Soft, nondistended, +bs    EXT: Nonpitting edema    SKIN: Warm    NEURO: unresponsive      Medications:  vancomycin  IVPB      norepinephrine Infusion 0.05 MICROgram(s)/kG/Min IV Continuous <Continuous>  pantoprazole  Injectable 40 milliGRAM(s) IV Push daily  insulin lispro (ADMELOG) corrective regimen sliding scale   SubCutaneous every 6 hours  chlorhexidine 0.12% Liquid 15 milliLiter(s) Oral Mucosa every 12 hours  chlorhexidine 2% Cloths 1 Application(s) Topical <User Schedule>      Mode: AC/ CMV (Assist Control/ Continuous Mandatory Ventilation)  RR (machine): 18  TV (machine): 350  FiO2: 30  PEEP: 5  ITime: 1  MAP: 12  PIP: 27      ICU Vital Signs Last 24 Hrs  T(C): 35.9 (10 Oct 2023 14:39), Max: 35.9 (10 Oct 2023 14:39)  T(F): 96.7 (10 Oct 2023 14:39), Max: 96.7 (10 Oct 2023 14:39)  HR: 104 (10 Oct 2023 22:30) (74 - 112)  BP: 137/85 (10 Oct 2023 22:30) (48/37 - 145/82)  BP(mean): 103 (10 Oct 2023 22:30) (89 - 104)  ABP: --  ABP(mean): --  RR: 19 (10 Oct 2023 22:30) (14 - 22)  SpO2: 100% (10 Oct 2023 22:30) (99% - 100%)    O2 Parameters below as of 10 Oct 2023 16:39  Patient On (Oxygen Delivery Method): ventilator      Vital Signs Last 24 Hrs  T(C): 35.9 (10 Oct 2023 14:39), Max: 35.9 (10 Oct 2023 14:39)  T(F): 96.7 (10 Oct 2023 14:39), Max: 96.7 (10 Oct 2023 14:39)  HR: 104 (10 Oct 2023 22:30) (74 - 112)  BP: 137/85 (10 Oct 2023 22:30) (48/37 - 145/82)  BP(mean): 103 (10 Oct 2023 22:30) (89 - 104)  RR: 19 (10 Oct 2023 22:30) (14 - 22)  SpO2: 100% (10 Oct 2023 22:30) (99% - 100%)    Parameters below as of 10 Oct 2023 16:39  Patient On (Oxygen Delivery Method): ventilator    ABG - ( 10 Oct 2023 18:33 )  pH, Arterial: 7.39  pH, Blood: x     /  pCO2: 38    /  pO2: 104   / HCO3: 23    / Base Excess: -1.7  /  SaO2: 98.0        I&O's Detail    10 Oct 2023 07:01  -  10 Oct 2023 23:45  --------------------------------------------------------  IN:    IV PiggyBack: 100 mL    Norepinephrine: 31.2 mL  Total IN: 131.2 mL    OUT:  Total OUT: 0 mL  Total NET: 131.2 mL      LABS:                        12.1   7.81  )-----------( 392      ( 10 Oct 2023 18:30 )             39.1     10-10    143  |  109<H>  |  27<H>  ----------------------------<  276<H>  3.3<L>   |  23  |  1.38<H>    Ca    8.1<L>      10 Oct 2023 18:30  Phos  4.1     10-10  Mg     2.2     10-10    TPro  6.1  /  Alb  2.3<L>  /  TBili  0.3  /  DBili  x   /  AST  225<H>  /  ALT  158<H>  /  AlkPhos  117  10-10      CAPILLARY BLOOD GLUCOSE  POCT Blood Glucose.: 255 mg/dL (10 Oct 2023 23:19)      PT/INR - ( 10 Oct 2023 14:15 )   PT: 12.3 sec;   INR: 1.03 ratio    PTT - ( 10 Oct 2023 14:15 )  PTT:34.1 sec      Urinalysis Basic - ( 10 Oct 2023 18:30 )  Color: x / Appearance: x / SG: x / pH: x  Gluc: 276 mg/dL / Ketone: x  / Bili: x / Urobili: x   Blood: x / Protein: x / Nitrite: x   Leuk Esterase: x / RBC: x / WBC x   Sq Epi: x / Non Sq Epi: x / Bacteria: x      CULTURES:  pending      RADIOLOGY:   < from: CT Head No Cont (10.10.23 @ 17:11) >  ACC: [id number] EXAM:  CT BRAIN   ORDERED BY: BRAVO AGUILAR     PROCEDURE DATE:  10/10/2023      INTERPRETATION:  CLINICAL INDICATION: Cardiac arrest    TECHNIQUE: Axial CT scanning of the brain was obtained from the skull   base to the vertex without the administration of intravenous contrast.   Reformatted coronal and sagittal images were subsequently obtained and   reviewed.    COMPARISON: None    FINDINGS:  There is no CT evidence of acute transcortical infarct. Age-related   involutionalchanges and chronic microvascular ischemic changes.    There is no hydrocephalus, mass effect, or acute intracranial hemorrhage.   No extra-axial collection. Basal cisterns are patent.    The visualized paranasal sinuses and mastoid air cells are clear.    The calvarium is intact.    IMPRESSION:  No evidence of acute transcortical infarct, acute intracranial   hemorrhage, or mass effect.    --- End of Report ---    JAX THURMAN MD; Attending Radiologist  This document has been electronically signed. Oct 10 2023  5:23PM    < end of copied text >

## 2023-10-11 NOTE — PHARMACOTHERAPY INTERVENTION NOTE - COMMENTS
As per policy, ordered MRSA nasal PCR since patient is on vancomycin empirically and is being treated for pneumonia.    Alka Bowling, PharmD   Clinical Pharmacy Specialist, Infectious Diseases  Tele-Antimicrobial Stewardship Program (Tele-ASP)  Tele-ASP Phone: [phone]

## 2023-10-11 NOTE — GOALS OF CARE CONVERSATION - ADVANCED CARE PLANNING - MOLST COMPLETED
11-Oct-2023 The resident's documentation has been prepared under my direction and personally reviewed by me in its entirety. I confirm that the note above accurately reflects all work, treatment, procedures, and medical decision making performed by me,  Steven Rouse MD

## 2023-10-11 NOTE — AIRWAY REMOVAL NOTE  ADULT & PEDS - ARTIFICAL AIRWAY REMOVAL COMMENTS
Written order for extubation verified. The patient was identified by full name and birth date compared to the identification band. RN was present during the procedure

## 2023-10-11 NOTE — PROGRESS NOTE ADULT - ASSESSMENT
ASSESSMENT:  89F s/p cardiac arrest X 2 after witnessed aspiration, patient noted to be down for 45 minutes. Now with no mental status, likely anoxic encephalopathy, MEGHAN and transaminitis. Patient intubated in the field, admitted to ICU for further work-up and monitoring.     PLAN:    Neuro:  -Initial CT negative, patient likely w/ anoxic encephalopathy  -Unresponsive off sedation  -Discussion with family today, family considering withdrawing care at this time.     CV:  -Hypotension requiring pressors, will wean as tolerated  - ASSESSMENT:  89F s/p cardiac arrest X 2 after witnessed aspiration, patient noted to be down for 45 minutes. Now with no mental status, likely anoxic encephalopathy, MEGHAN and transaminitis. Patient intubated in the field, admitted to ICU for further work-up and monitoring. Discussion with family at bedside, will most likely will withdraw care today, now on comfort care.    PLAN:    Neuro:  -Initial CT negative, patient likely w/ anoxic encephalopathy  -Unresponsive off sedation  -Discussion with family at bedside, will most likely withdraw care later today    CV/PULM:  -Plan to discontinue pressors  -palliative extubation   -Will start morphine gtt for comfort  -scopolamine for increased secretions    ASSESSMENT:  89F s/p cardiac arrest X 2 after witnessed aspiration, patient noted to be down for 45 minutes. Now with no mental status, likely anoxic encephalopathy, MEGHAN and transaminitis. Patient intubated in the field, admitted to ICU for further work-up and monitoring. Discussion with family at bedside, will most likely will withdraw care today, now on comfort care.    PLAN:    Neuro:  -Initial CT negative, patient likely w/ anoxic encephalopathy  -Unresponsive off sedation  -Discussion with family at bedside, will most likely withdraw care later today    CV/PULM:  -Plan to discontinue pressors  -palliative extubation, now on full ventilator support  -Will start morphine gtt for comfort  -scopolamine for increased secretions     RENAL:  -MEGHAN, continue maintenance fluids D5LR 100mL/hr  -Trend electrolytes replete as needed     GI:  -NPO  -NGT to LCWS  -Protonix 40mg daily for GI PPX    ENDO:  -Continue D5LR, patient hypoglycemic overnight to 15, given 2 amps of D50  -Continue FS Q6 w/ ISS coverage    ID:     ASSESSMENT:  89F s/p cardiac arrest X 2 after witnessed aspiration, patient noted to be down for 45 minutes. Now with no mental status, likely anoxic encephalopathy, MEGHAN and transaminitis. Patient intubated in the field, admitted to ICU for further work-up and monitoring. Discussion with family at bedside, will most likely will withdraw care today, now on comfort care.    PLAN:    Neuro:  -Initial CT negative, patient likely w/ anoxic encephalopathy  -Unresponsive off sedation  -Discussion with family at bedside, will most likely withdraw care later today    CV/PULM:  -Plan to discontinue pressors  -palliative extubation, now on full ventilator support, maintain O2 >92% at this time  -Will start morphine gtt for comfort  -scopolamine for increased secretions     RENAL:  -MEGHAN, continue maintenance fluids D5LR 100mL/hr  -Trend electrolytes replete as needed   -Bladder scan Q8, no urine output since admission       GI:  -NPO  -NGT to LCWS  -Protonix 40mg daily for GI PPX    ENDO:  -Continue D5LR, patient hypoglycemic overnight to 15, given 2 amps of D50  -Continue FS Q6 w/ ISS coverage  -Hydrocortisone ordered for hypoglycemia possibly 2/2 adrenal insufficiency will discontinue    ID:  -Entero/rhinovirus positive  -Blood cultures pending  -continue empiric abx with zosyn and vancomycin         ASSESSMENT:  89F s/p cardiac arrest X 2 after witnessed aspiration, patient noted to be down for 45 minutes. Now with no mental status, likely anoxic encephalopathy, MEGHAN and transaminitis. Patient intubated in the field, admitted to ICU for further work-up and monitoring. Discussion with family at bedside, will most likely will withdraw care today, now on comfort care.    PLAN:    Neuro:  -Initial CT negative, patient likely w/ anoxic encephalopathy  -Unresponsive off sedation  -Discussion with family at bedside, will most likely withdraw care later today    CV/PULM:  -Plan to discontinue pressors  -palliative extubation, now on full ventilator support, maintain O2 >92% at this time  -Will start morphine gtt for comfort  -scopolamine for increased secretions     RENAL:  -MEGHAN, continue maintenance fluids D5LR 100mL/hr  -Trend electrolytes replete as needed   -Bladder scan Q8, no urine output since admission       GI:  -NPO  -NGT to LCWS  -Protonix 40mg daily for GI PPX    ENDO:  -Continue D5LR, patient hypoglycemic overnight to 15, given 2 amps of D50  -Continue FS Q6 w/ ISS coverage  -Hydrocortisone ordered for hypoglycemia possibly 2/2 adrenal insufficiency will discontinue    ID:  -Entero/rhinovirus positive  -Blood cultures pending  -continue empiric abx with zosyn and vancomycin  -Febrile overnight, continue tylenol PRN         ASSESSMENT:  89F s/p cardiac arrest X 2 after witnessed aspiration, patient noted to be down for 45 minutes. Now with no mental status, likely anoxic encephalopathy, MEGHAN and transaminitis. Patient intubated in the field, admitted to ICU for further work-up and monitoring. Discussion with family at bedside, will most likely will withdraw care today, now on comfort care.    PLAN:    Neuro:  -Initial CT negative, patient likely w/ anoxic encephalopathy  -Unresponsive off sedation  -Discussion with family at bedside, will most likely withdraw care later today    CV/PULM:  -Continue levophed to maintain MAP>65  -Troponin elevated most likely demand ischemia 2/2 cardiac arrest, trend until peak  -Now on full ventilator support, maintain O2 >92% at this time    RENAL:  -MEGHAN, continue maintenance fluids D5LR 100mL/hr  -Trend electrolytes replete as needed   -Bladder scan Q8, no urine output since admission       GI:  -NPO  -NGT to LCWS  -Protonix 40mg daily for GI PPX    ENDO:  -Continue D5LR, patient hypoglycemic overnight to 15, given 2 amps of D50  -Continue FS Q6 w/ ISS coverage  -Hydrocortisone ordered for hypoglycemia possibly 2/2 adrenal insufficiency will discontinue    ID:  -Entero/rhinovirus positive  -Blood cultures pending  -continue empiric abx with zosyn and vancomycin  -Febrile overnight, continue tylenol PRN    Palliative:   GOC discussions ongoing, plan for palliative extubation later today  -Morphine gtt will be started for comfort/pain  -plan to dc pressors  -Scopolamine for increased secretions

## 2023-10-11 NOTE — PROGRESS NOTE ADULT - SUBJECTIVE AND OBJECTIVE BOX
INTERVAL HPI/OVERNIGHT EVENTS: Patient seen and examined at bedside, hypoglycemic overnight requiring 2 amps of D50, started on D5LR. Mental status without improvement, minimal pressor requirements at this time. Fever overnight to 103.    SUBJECTIVE: Patient seen and examined at bedside.     ROS: All negative except as listed above.    VITAL SIGNS:  ICU Vital Signs Last 24 Hrs  T(C): 38.1 (11 Oct 2023 12:15), Max: 39.6 (11 Oct 2023 03:30)  T(F): 100.6 (11 Oct 2023 12:15), Max: 103.3 (11 Oct 2023 03:30)  HR: 95 (11 Oct 2023 12:15) (74 - 112)  BP: 118/72 (11 Oct 2023 12:15) (48/37 - 145/82)  BP(mean): 88 (11 Oct 2023 12:15) (58 - 126)  ABP: --  ABP(mean): --  RR: 20 (11 Oct 2023 12:15) (14 - 23)  SpO2: 100% (11 Oct 2023 12:15) (98% - 100%)    O2 Parameters below as of 11 Oct 2023 07:15  Patient On (Oxygen Delivery Method): ventilator, ETT: ,PEEP5, RR18, FIO2 30%    O2 Concentration (%): 30      Mode: AC/ CMV (Assist Control/ Continuous Mandatory Ventilation), RR (machine): 18, TV (machine): 350, FiO2: 30, PEEP: 5, ITime: 1, MAP: 12, PIP: 25  Plateau pressure:   P/F ratio:     10-10 @ 07:01  -  10-11 @ 07:00  --------------------------------------------------------  IN: 884.2 mL / OUT: 0 mL / NET: 884.2 mL    10-11 @ 07:01  -  10-11 @ 13:27  --------------------------------------------------------  IN: 332.4 mL / OUT: 0 mL / NET: 332.4 mL      CAPILLARY BLOOD GLUCOSE      POCT Blood Glucose.: 99 mg/dL (11 Oct 2023 10:20)      ECG: reviewed.    PHYSICAL EXAM:    GENERAL: NAD, lying in bed comfortably.  HEAD:  Atraumatic, normocephalic  EYES: Pupils non-reactive, conjunctiva and sclera clear  NECK: Supple, trachea midline, no JVD  HEART: Sinus tachycardia, no murmurs, rubs, or gallops  LUNGS: Unlabored respirations.  Clear to auscultation bilaterally, no crackles, wheezing, or rhonchi  ABDOMEN: Soft, nontender, nondistended, +BS  EXTREMITIES: 2+ peripheral pulses bilaterally, cap refill<2 secs. No clubbing, cyanosis, or edema  NERVOUS SYSTEM:  A&Ox3, following commands, moving all extremities, no focal deficits   SKIN: No rashes or lesions    MEDICATIONS:  MEDICATIONS  (STANDING):  chlorhexidine 0.12% Liquid 15 milliLiter(s) Oral Mucosa every 12 hours  chlorhexidine 2% Cloths 1 Application(s) Topical <User Schedule>  dextrose 5% + lactated ringers. 1000 milliLiter(s) (100 mL/Hr) IV Continuous <Continuous>  hydrocortisone sodium succinate Injectable 50 milliGRAM(s) IV Push every 6 hours  norepinephrine Infusion 0.05 MICROgram(s)/kG/Min (5.63 mL/Hr) IV Continuous <Continuous>  pantoprazole  Injectable 40 milliGRAM(s) IV Push daily  piperacillin/tazobactam IVPB.. 3.375 Gram(s) IV Intermittent every 12 hours  scopolamine 1 mG/72 Hr(s) Patch 1 Patch Transdermal once  vancomycin  IVPB 500 milliGRAM(s) IV Intermittent every 24 hours  vancomycin  IVPB        MEDICATIONS  (PRN):      ALLERGIES:  Allergies    No Known Allergies    Intolerances        LABS:                        12.3   8.53  )-----------( 402      ( 11 Oct 2023 03:35 )             38.9     10-11    143  |  111<H>  |  31<H>  ----------------------------<  23<LL>  3.4<L>   |  24  |  1.72<H>    Ca    8.2<L>      11 Oct 2023 05:20  Phos  2.9     10-11  Mg     2.2     10-11    TPro  5.8<L>  /  Alb  2.0<L>  /  TBili  0.4  /  DBili  x   /  AST  204<H>  /  ALT  118<H>  /  AlkPhos  97  10-11    PT/INR - ( 10 Oct 2023 14:15 )   PT: 12.3 sec;   INR: 1.03 ratio         PTT - ( 10 Oct 2023 14:15 )  PTT:34.1 sec  Urinalysis Basic - ( 11 Oct 2023 05:20 )    Color: x / Appearance: x / SG: x / pH: x  Gluc: 23 mg/dL / Ketone: x  / Bili: x / Urobili: x   Blood: x / Protein: x / Nitrite: x   Leuk Esterase: x / RBC: x / WBC x   Sq Epi: x / Non Sq Epi: x / Bacteria: x      ABG:  pH, Arterial: 7.39 (10-10-23 @ 18:33)  pCO2, Arterial: 38 mmHg (10-10-23 @ 18:33)  pO2, Arterial: 104 mmHg (10-10-23 @ 18:33)  pH, Arterial: 7.12 (10-10-23 @ 14:23)  pCO2, Arterial: 63 mmHg (10-10-23 @ 14:23)  pO2, Arterial: 86 mmHg (10-10-23 @ 14:23)      vBG:    Micro:        RADIOLOGY & ADDITIONAL TESTS: Reviewed.   INTERVAL HPI/OVERNIGHT EVENTS: Patient seen and examined at bedside, hypoglycemic overnight requiring 2 amps of D50, started on D5LR. Mental status without improvement, minimal pressor requirements at this time. Fever overnight to 103.    SUBJECTIVE: Patient seen and examined at bedside.     ROS: All negative except as listed above.    VITAL SIGNS:  ICU Vital Signs Last 24 Hrs  T(C): 38.1 (11 Oct 2023 12:15), Max: 39.6 (11 Oct 2023 03:30)  T(F): 100.6 (11 Oct 2023 12:15), Max: 103.3 (11 Oct 2023 03:30)  HR: 95 (11 Oct 2023 12:15) (74 - 112)  BP: 118/72 (11 Oct 2023 12:15) (48/37 - 145/82)  BP(mean): 88 (11 Oct 2023 12:15) (58 - 126)  ABP: --  ABP(mean): --  RR: 20 (11 Oct 2023 12:15) (14 - 23)  SpO2: 100% (11 Oct 2023 12:15) (98% - 100%)    O2 Parameters below as of 11 Oct 2023 07:15  Patient On (Oxygen Delivery Method): ventilator, ETT: ,PEEP5, RR18, FIO2 30%    O2 Concentration (%): 30      Mode: AC/ CMV (Assist Control/ Continuous Mandatory Ventilation), RR (machine): 18, TV (machine): 350, FiO2: 30, PEEP: 5, ITime: 1, MAP: 12, PIP: 25  Plateau pressure:   P/F ratio:     10-10 @ 07:01  -  10-11 @ 07:00  --------------------------------------------------------  IN: 884.2 mL / OUT: 0 mL / NET: 884.2 mL    10-11 @ 07:01  -  10-11 @ 13:27  --------------------------------------------------------  IN: 332.4 mL / OUT: 0 mL / NET: 332.4 mL      CAPILLARY BLOOD GLUCOSE      POCT Blood Glucose.: 99 mg/dL (11 Oct 2023 10:20)      ECG: reviewed.    PHYSICAL EXAM:    GENERAL: NAD, lying in bed comfortably.  HEAD:  Atraumatic, normocephalic  EYES: Pupils non-reactive, conjunctiva and sclera clear  NECK: Supple, trachea midline, no JVD  HEART: Sinus tachycardia, no murmurs, rubs, or gallops  LUNGS: Breathing over vent,  Rhonchi to auscultation bilaterally, no crackles, wheezing.  ABDOMEN: + large umbilical hernia, +distention, soft, nontender, +BS  EXTREMITIES: 2+ peripheral pulses bilaterally, cap refill<2 secs. No clubbing, cyanosis, or edema  NERVOUS SYSTEM:  Unresponsive, not responding to painful stimuli, no gag/cough.   SKIN: No rashes or lesions    MEDICATIONS:  MEDICATIONS  (STANDING):  chlorhexidine 0.12% Liquid 15 milliLiter(s) Oral Mucosa every 12 hours  chlorhexidine 2% Cloths 1 Application(s) Topical <User Schedule>  dextrose 5% + lactated ringers. 1000 milliLiter(s) (100 mL/Hr) IV Continuous <Continuous>  hydrocortisone sodium succinate Injectable 50 milliGRAM(s) IV Push every 6 hours  norepinephrine Infusion 0.05 MICROgram(s)/kG/Min (5.63 mL/Hr) IV Continuous <Continuous>  pantoprazole  Injectable 40 milliGRAM(s) IV Push daily  piperacillin/tazobactam IVPB.. 3.375 Gram(s) IV Intermittent every 12 hours  scopolamine 1 mG/72 Hr(s) Patch 1 Patch Transdermal once  vancomycin  IVPB 500 milliGRAM(s) IV Intermittent every 24 hours  vancomycin  IVPB        MEDICATIONS  (PRN):      ALLERGIES:  Allergies    No Known Allergies    Intolerances        LABS:                        12.3   8.53  )-----------( 402      ( 11 Oct 2023 03:35 )             38.9     10-11    143  |  111<H>  |  31<H>  ----------------------------<  23<LL>  3.4<L>   |  24  |  1.72<H>    Ca    8.2<L>      11 Oct 2023 05:20  Phos  2.9     10-11  Mg     2.2     10-11    TPro  5.8<L>  /  Alb  2.0<L>  /  TBili  0.4  /  DBili  x   /  AST  204<H>  /  ALT  118<H>  /  AlkPhos  97  10-11    PT/INR - ( 10 Oct 2023 14:15 )   PT: 12.3 sec;   INR: 1.03 ratio         PTT - ( 10 Oct 2023 14:15 )  PTT:34.1 sec  Urinalysis Basic - ( 11 Oct 2023 05:20 )    Color: x / Appearance: x / SG: x / pH: x  Gluc: 23 mg/dL / Ketone: x  / Bili: x / Urobili: x   Blood: x / Protein: x / Nitrite: x   Leuk Esterase: x / RBC: x / WBC x   Sq Epi: x / Non Sq Epi: x / Bacteria: x      ABG:  pH, Arterial: 7.39 (10-10-23 @ 18:33)  pCO2, Arterial: 38 mmHg (10-10-23 @ 18:33)  pO2, Arterial: 104 mmHg (10-10-23 @ 18:33)  pH, Arterial: 7.12 (10-10-23 @ 14:23)  pCO2, Arterial: 63 mmHg (10-10-23 @ 14:23)  pO2, Arterial: 86 mmHg (10-10-23 @ 14:23)      vBG:    Micro:        RADIOLOGY & ADDITIONAL TESTS: Reviewed.

## 2023-10-11 NOTE — GOALS OF CARE CONVERSATION - ADVANCED CARE PLANNING - CONVERSATION DETAILS
Was approached by patient family collectively that they wish to remove pt endotracheal tube with the knowledge that she will likely pass. Patient family 3 daughters, one son, and pt granddaughter td all are agreeable. They wish for her to be DNR / DNI, comfort measures, no feeding tube, no heroic measures, no iv fluids. They all understand patient will likely pass.

## 2023-10-11 NOTE — DISCHARGE NOTE FOR THE EXPIRED PATIENT - HOSPITAL COURSE
Mrs. Rankin was a 89 year old female with a PMH of dementia, HTN and DM type 2 who presented to [facility] on 10/10 s/p prolonged cardiac arrest. Pt admitted to ICU for tx of suspected anoxic brain injury, AMS, hypoxemic respiratory failure, likely aspiration PNA, Septic shock, MEGHAN and transaminitis. Pt required vasopressor support and noted to remain unresponsive on neurological exam. Patient family requesting palliative wean from MV with comfort measures and DNR/DNI status in place, MOLST form filled out with them. All members agreeing to plan and at bedside. At  ett removed and shortly thereafter pt  as noted with loss of pulse at 2130. Family at bedside. Upon exam pt unresponsive with pupils fixed / dilated, asystole on monitor, no s1/s2 auscultated, no pulses palpated and no spontaneous chest rise. Condolences and emotional support offered to family.  Mrs. Rankin was a 89 year old female with a PMH of dementia, HTN and DM type 2 who presented to [facility] on 10/10 s/p prolonged cardiac arrest. Pt admitted to ICU for tx of suspected anoxic brain injury, AMS, hypoxemic respiratory failure, likely aspiration PNA, Septic shock, MEGHAN and transaminitis. Pt required vasopressor support and noted to remain unresponsive on neurological exam. Patient family requesting palliative wean from MV with comfort measures and DNR/DNI status in place, MOLST form filled out with them. All members agreeing to plan and at bedside. At  ett removed and shortly thereafter pt  as noted with loss of pulse at 2130. Family at bedside. Upon exam pt unresponsive with pupils fixed / dilated, asystole on monitor, no s1/s2 auscultated, no pulses palpated and no spontaneous chest rise. Condolences and emotional support offered to family.       Time spent on this patient encounter, which includes documenting this note in the electronic medical record, was 30 minutes including assessing the presenting problems with associated risks, reviewing the medical record to prepare for the encounter, and meeting face to face with the patient to obtain additional history. I have also performed an appropriate physical exam, made interventions listed and ordered and interpreted appropriate diagnostic studies as documented. To improve communication and patient safety, I have coordinated care with the multidisciplinary team including the bedside nurse, appropriate attending of record and consultants as needed.

## 2023-10-11 NOTE — DISCHARGE NOTE FOR THE EXPIRED PATIENT - NS PATIENT DEATH CRITERIA
unresponsive, pupils fixed / dilated, no pulse, no s1/s2 noted, asystole on monitor/Patient is dead based on Cardiopulmonary criteria including absent breath sounds, pulselessness and/or asystole

## 2023-10-11 NOTE — PROGRESS NOTE ADULT - ATTENDING COMMENTS
Pt is a 89 yo F with h/o dementia BIBEMS s/p  cardiac arrest. On 10/10 pt had witnessed cardiac arrest after aspirating on water, daughter called EMS. EMS reported approx 45min downtime. ROSC once at home and then coded again in ambulance with ROSC again.  Asystole and PEA.  5x epi given, no shocks.  Intubated with 7.5ETT. Pt found to be Entero/Rhinovirus. ICU admitting dx: 1) s/p cardiac arrest with post arrest myocardial dysfxn (pt on pressor) and probable anoxic encephalopathy 2) NSTEMI 3) MEGHAN 2 to ATN 4) Shock liver. Family at the bedside and understand pt's extremely poor prognosis. Pt is DNR/DNI and family waiting for other family members to be present for palliative extubation.    CCT: 45 min not in conjunction with the NP student

## 2023-10-15 LAB
CULTURE RESULTS: SIGNIFICANT CHANGE UP
CULTURE RESULTS: SIGNIFICANT CHANGE UP
SPECIMEN SOURCE: SIGNIFICANT CHANGE UP
SPECIMEN SOURCE: SIGNIFICANT CHANGE UP

## 2023-10-16 DIAGNOSIS — R65.21 SEVERE SEPSIS WITH SEPTIC SHOCK: ICD-10-CM

## 2023-10-16 DIAGNOSIS — J96.01 ACUTE RESPIRATORY FAILURE WITH HYPOXIA: ICD-10-CM

## 2023-10-16 DIAGNOSIS — Z66 DO NOT RESUSCITATE: ICD-10-CM

## 2023-10-16 DIAGNOSIS — E11.65 TYPE 2 DIABETES MELLITUS WITH HYPERGLYCEMIA: ICD-10-CM

## 2023-10-16 DIAGNOSIS — J69.0 PNEUMONITIS DUE TO INHALATION OF FOOD AND VOMIT: ICD-10-CM

## 2023-10-16 DIAGNOSIS — I46.8 CARDIAC ARREST DUE TO OTHER UNDERLYING CONDITION: ICD-10-CM

## 2023-10-16 DIAGNOSIS — I46.9 CARDIAC ARREST, CAUSE UNSPECIFIED: ICD-10-CM

## 2023-10-16 DIAGNOSIS — B34.8 OTHER VIRAL INFECTIONS OF UNSPECIFIED SITE: ICD-10-CM

## 2023-10-16 DIAGNOSIS — E27.40 UNSPECIFIED ADRENOCORTICAL INSUFFICIENCY: ICD-10-CM

## 2023-10-16 DIAGNOSIS — B34.1 ENTEROVIRUS INFECTION, UNSPECIFIED: ICD-10-CM

## 2023-10-16 DIAGNOSIS — J96.02 ACUTE RESPIRATORY FAILURE WITH HYPERCAPNIA: ICD-10-CM

## 2023-10-16 DIAGNOSIS — I21.4 NON-ST ELEVATION (NSTEMI) MYOCARDIAL INFARCTION: ICD-10-CM

## 2023-10-16 DIAGNOSIS — N17.0 ACUTE KIDNEY FAILURE WITH TUBULAR NECROSIS: ICD-10-CM

## 2023-10-16 DIAGNOSIS — G93.1 ANOXIC BRAIN DAMAGE, NOT ELSEWHERE CLASSIFIED: ICD-10-CM

## 2023-10-16 DIAGNOSIS — K72.00 ACUTE AND SUBACUTE HEPATIC FAILURE WITHOUT COMA: ICD-10-CM

## 2023-10-16 DIAGNOSIS — I10 ESSENTIAL (PRIMARY) HYPERTENSION: ICD-10-CM

## 2023-10-16 DIAGNOSIS — F03.90 UNSPECIFIED DEMENTIA, UNSPECIFIED SEVERITY, WITHOUT BEHAVIORAL DISTURBANCE, PSYCHOTIC DISTURBANCE, MOOD DISTURBANCE, AND ANXIETY: ICD-10-CM

## 2023-10-16 DIAGNOSIS — Z11.52 ENCOUNTER FOR SCREENING FOR COVID-19: ICD-10-CM

## 2023-10-16 DIAGNOSIS — K42.9 UMBILICAL HERNIA WITHOUT OBSTRUCTION OR GANGRENE: ICD-10-CM

## 2023-10-16 DIAGNOSIS — A41.9 SEPSIS, UNSPECIFIED ORGANISM: ICD-10-CM

## 2023-10-16 DIAGNOSIS — Z51.5 ENCOUNTER FOR PALLIATIVE CARE: ICD-10-CM

## 2023-10-16 DIAGNOSIS — E11.649 TYPE 2 DIABETES MELLITUS WITH HYPOGLYCEMIA WITHOUT COMA: ICD-10-CM
